# Patient Record
Sex: FEMALE | Race: BLACK OR AFRICAN AMERICAN | Employment: FULL TIME | URBAN - METROPOLITAN AREA
[De-identification: names, ages, dates, MRNs, and addresses within clinical notes are randomized per-mention and may not be internally consistent; named-entity substitution may affect disease eponyms.]

---

## 2018-08-21 ENCOUNTER — HOSPITAL ENCOUNTER (OUTPATIENT)
Dept: PHYSICAL THERAPY | Age: 63
Discharge: HOME | End: 2018-08-21
Payer: COMMERCIAL

## 2018-08-21 PROCEDURE — 97161 PT EVAL LOW COMPLEX 20 MIN: CPT | Performed by: PHYSICAL THERAPIST

## 2018-08-21 PROCEDURE — 97110 THERAPEUTIC EXERCISES: CPT | Performed by: PHYSICAL THERAPIST

## 2018-08-21 NOTE — PROGRESS NOTES
[facility] [address] Yogi Saldivar, [address] [facility]  Ph [phone]  Fx [phone]    Plan of Care/ Statement of Necessity for Physical Therapy Services    Patient name: Anneliese Stockton Start of Care: 2018   Referral source: Carrie Jesus MD : 1955    Medical Diagnosis: Achilles tendinitis, left leg [M76.62]  Achilles tendinitis, right leg [M76.61]  Calcaneal spur, left foot [M77.32]   Onset Date:1.5months    Treatment Diagnosis: bilateral ankle pain   Prior Hospitalization: see medical history Provider#: [id number]   Medications: Verified on Patient summary List    Comorbidities: RA, HTN, hx Hep B. Prior Level of Function: sedentary      The Plan of Care and following information is based on the information from the initial evaluation. Assessment/ key information: Patient present with signs/symptoms of bilateral achilles tendinopathy. Swelling in ankles appears related to rheumatoid arthritis flare up. Has reduction of pain with body weight plantar flexion isometrics and normalization of gait characteristics. Patient will continue to benefit from skilled PT services to modify and progress therapeutic interventions, address functional mobility deficits, address ROM deficits, address strength deficits, analyze and address soft tissue restrictions, analyze and cue movement patterns, analyze and modify body mechanics/ergonomics and assess and modify postural abnormalities to attain remaining goals.     Evaluation Complexity History MEDIUM  Complexity : 1-2 comorbidities / personal factors will impact the outcome/ POC ; Examination LOW Complexity : 1-2 Standardized tests and measures addressing body structure, function, activity limitation and / or participation in recreation  ;Presentation LOW Complexity : Stable, uncomplicated  ;Clinical Decision Making MEDIUM Complexity : FOTO score of 26-74  Overall Complexity Rating: LOW   Problem List: pain affecting function, decrease ROM, decrease strength, edema affecting function, impaired gait/ balance, decrease ADL/ functional abilitiies, decrease activity tolerance, decrease flexibility/ joint mobility and decrease transfer abilities   Treatment Plan may include any combination of the following: Therapeutic exercise, Therapeutic activities, Neuromuscular re-education, Physical agent/modality, Gait/balance training, Manual therapy, Aquatic therapy, Patient education and Self Care training  Patient / Family readiness to learn indicated by: asking questions, trying to perform skills and interest  Persons(s) to be included in education: patient (P)  Barriers to Learning/Limitations: None  Patient Goal (s): my ankle heel get back to normal no swelling & have to limp when I walk  Patient Self Reported Health Status: good  Rehabilitation Potential: good    Short Term Goals: To be accomplished in 2 weeks:   Patient will report compliance with HEP at least 1x/day to aid in rehabilitation program.   Status at IE: NA   Current:     Long Term Goals: To be accomplished in 4 weeks:   Patient will increase strength to 4+/5 throughout B LEs to aid in return recreational activities and ADLs. Status at IE:4/5 in plantar flexion   Current:     Patient will report pain less than 1-2/10 average to aid in completion of ADLs. Status at IE:6 or worse   Current:     Patient will ambulate 1000ft on level surface and no AD pain free with normalized gait to complete ADLs. Status at IE:antalgic gait   Current:     Patient will improve FOTO to 77 points overall to demonstrate improvement in functional ability. Status at IE:5   Current:         Frequency / Duration: Patient to be seen 2 times per week for 6 weeks.     Patient/ Caregiver education and instruction: Diagnosis, prognosis, self care, activity modification and exercises   [x]  Plan of care has been reviewed with YEISON Dumont PT, DPT 8/21/2018 4:06 PM    ________________________________________________________________________    I certify that the above Therapy Services are being furnished while the patient is under my care. I agree with the treatment plan and certify that this therapy is necessary.     Physician's Signature:____________Date:_________TIME:________    [employer], Date and Time must be completed for valid certification **  Please sign and return to [facility]  [address], [address]  Ph [phone]  Fx [phone]

## 2018-08-21 NOTE — PROGRESS NOTES
PT DAILY TREATMENT NOTE/FOOT AND ANKLE EVAL 3-16    Patient Name: Anneliese Stockton  Date:2018  : 1955  [x]  Patient  Verified  Payor: Shey Zavala / Plan: [address] [place] [address] / Product Type: PPO /    In time:3:00  Out time:4:00  Total Treatment Time (min): 60  Total Timed Codes (min): 30  1:1 Treatment Time ( only): 60   Visit #: 1 of 12    Treatment Area: Achilles tendinitis, left leg [M76.62]  Achilles tendinitis, right leg [M76.61]  Calcaneal spur, left foot [M77.32]    SUBJECTIVE  Pain Level (0-10 scale): 6  []constant []intermittent []improving []worsening []no change since onset    Any medication changes, allergies to medications, adverse drug reactions, diagnosis change, or new procedure performed?: [x] No    [] Yes (see summary sheet for update)  Subjective functional status/changes:     Patient has CC of B ankle pain for 1.5 months. JIMENA: none. Patient describes pain as sore, TTP, constant. . No diurnal features. Denies numbness/tingling. Denies popping/clicking. Aggravating factors:walking, dorsiflexion. Alleviating factors: rest.  Denies red flags: SOB, chest pain, dizziness/lightheadedness, blurred/double vision, HA, chills/fevers, night sweats, change in bowel/bladder control, abdominal pain, difficulty swallowing, slurred speech, unexplained weight gain/loss. PMHx: RA, HTN, hx Hep B. Surgical Hx: unremarkable. Social Hx: 3 [employer] home, denies alcohol & tobacco, work status:  at [address]. PLOF: sedentary. CLOF: same.   Diagnostic Imaging: x-ray: heel spurs      OBJECTIVE/EXAMINATION    30- min [x]Eval                  []Re-Eval       30 min Therapeutic Exercise:  [x] See flow sheet :   Rationale: increase ROM, increase strength and decrease pain to improve the patients ability to complete ADLs      With   [] TE   [] TA   [] neuro   [] other: Patient Education: [x] Review HEP    [] Progressed/Changed HEP based on:   [] positioning   [] body mechanics   [] transfers   [] heat/ice application    [] other:        Physical Therapy Evaluation  - Foot and Ankle    Gait: [] Normal    [x] Abnormal    [x] Antalgic    [] NWB    Device:  Describe:    ROM/Strength  [] Unable to assess at this time  ROM: WNL throughout             Strength (1-5)   Left  Right   Dorsiflexion 5 5   Plantarflexion 4- 4-   Inversion 5 4   Eversion 5 5   Great Toe Ext 5 5     Flexibility: [] Unable to assess at this time    Palpation:   Location: achilles tendon insertion bilaterally  Patient's Pain Response: [x] Min   [] Mod   [] Severe  Location:  Patient's Pain Response: [] Min   [] Mod   [] Severe      Swelling:   Left (cm) Right (cm)   Figure 8: 49.5 51.5     Anterior Drawer: [x] Neg    [] Pos  Posterior Drawer:  [x] Neg    [] Pos  Inversion Stress:  [x] Neg    [] Pos  Talar Tilt:   [] Neg    [] Pos    Other tests/ comments:       Pain Level (0-10 scale) post treatment: 3    ASSESSMENT/Changes in Function: Patient present with signs/symptoms of bilateral achilles tendinopathy. Swelling in ankles appears related to rheumatoid arthritis flare up. Has reduction of pain with body weight plantar flexion isometrics and normalization of gait characteristics. Patient will continue to benefit from skilled PT services to modify and progress therapeutic interventions, address functional mobility deficits, address ROM deficits, address strength deficits, analyze and address soft tissue restrictions, analyze and cue movement patterns, analyze and modify body mechanics/ergonomics and assess and modify postural abnormalities to attain remaining goals.      [x]  See Plan of Care  []  See progress note/recertification  []  See Discharge Summary         Progress towards goals / Updated goals:  See POC    PLAN  []  Upgrade activities as tolerated     [x]  Continue plan of care  []  Update interventions per flow sheet       []  Discharge due to:_  []  Other:_      Jose Ariza, PT, DPT 8/21/2018  3:03 PM

## 2018-08-23 ENCOUNTER — HOSPITAL ENCOUNTER (OUTPATIENT)
Dept: PHYSICAL THERAPY | Age: 63
Discharge: HOME | End: 2018-08-23
Payer: COMMERCIAL

## 2018-08-23 PROCEDURE — 97530 THERAPEUTIC ACTIVITIES: CPT

## 2018-08-23 PROCEDURE — 97110 THERAPEUTIC EXERCISES: CPT

## 2018-08-23 NOTE — PROGRESS NOTES
PT DAILY TREATMENT NOTE     Patient Name: Koki Isaac  Date:2018  : 1955  [x]  Patient  Verified  Payor: Christoph Oliveros / Plan: [address] [place] [address] / Product Type: PPO /    In time:4:30  Out time:5:08  Total Treatment Time (min): 38  Visit #: 2 of 12    Treatment Area: Achilles tendinitis, left leg [M76.62]  Achilles tendinitis, right leg [M76.61]  Calcaneal spur, left foot [M77.32]    SUBJECTIVE  Pain Level (0-10 scale): 10  Any medication changes, allergies to medications, adverse drug reactions, diagnosis change, or new procedure performed?: [x] No    [] Yes (see summary sheet for update)  Subjective functional status/changes:   [] No changes reported  \"It just hurts in the backs of my heels really. \"    OBJECTIVE      28 min Therapeutic Exercise:  [x] See flow sheet :   Rationale: increase ROM, increase strength and improve coordination to improve the patients ability to return to prior level of physical activity. 10 min Therapeutic Activity:  [x]  See flow sheet :   Rationale: increase ROM, increase strength and improve coordination  to improve the patients ability to perform ADLs with less pain. With   [] TE   [] TA   [] neuro   [] other: Patient Education: [x] Review HEP    [] Progressed/Changed HEP based on:   [] positioning   [] body mechanics   [] transfers   [] heat/ice application    [] other:      Other Objective/Functional Measures:      Pain Level (0-10 scale) post treatment: 5/10    ASSESSMENT/Changes in Function: Pt tolerated ex with moderate discomfort but overall light decrease in pain. Pt reports compliance with HEP of several times per day. Pt had no adverse affects post tx.      Patient will continue to benefit from skilled PT services to modify and progress therapeutic interventions, address functional mobility deficits, address ROM deficits, address strength deficits, analyze and address soft tissue restrictions, analyze and cue movement patterns, analyze and modify body mechanics/ergonomics and assess and modify postural abnormalities to attain remaining goals. []  See Plan of Care  []  See progress note/recertification  []  See Discharge Summary         Progress towards goals / Updated goals:  Short Term Goals: To be accomplished in 2 weeks:                        Patient will report compliance with HEP at least 1x/day to aid in rehabilitation program.                        Status at IE: NA                        Current:  [address] be accomplished in 4 weeks:                        Patient will increase strength to 4+/5 throughout B LEs to aid in return recreational activities and ADLs. Status at IE:4/5 in plantar flexion                        Current:                           Patient will report pain less than 1-2/10 average to aid in completion of ADLs. Status at IE:6 or worse                        Current:                           Patient will ambulate 1000ft on level surface and no AD pain free with normalized gait to complete ADLs. Status at IE:antalgic gait                        Current:                           Patient will improve FOTO to 77 points overall to demonstrate improvement in functional ability.                         Status at IE:5                        Current:    PLAN  [x]  Upgrade activities as tolerated     [x]  Continue plan of care  []  Update interventions per flow sheet       []  Discharge due to:_  []  Other:_      Steve Wray PTA 8/23/2018  4:39 PM    Future Appointments  Date Time Provider Marques Gan   8/29/2018 3:00 PM Steve Wray [facility]   8/31/2018 3:00 PM Steve Wray PTA [facility]   9/4/2018 4:00 PM Abdiel Boykin PT, DPT [facility]   9/6/2018 3:30 PM Abdiel Boykin PT, DPT [facility]   9/11/2018 3:00 PM Steve Wray PTA [facility]   9/13/2018 3:30 PM Abdiel Boykin PT, DPT [facility]

## 2018-08-29 ENCOUNTER — HOSPITAL ENCOUNTER (OUTPATIENT)
Dept: PHYSICAL THERAPY | Age: 63
Discharge: HOME | End: 2018-08-29
Payer: COMMERCIAL

## 2018-08-29 PROCEDURE — 97110 THERAPEUTIC EXERCISES: CPT

## 2018-08-29 PROCEDURE — 97530 THERAPEUTIC ACTIVITIES: CPT

## 2018-08-29 NOTE — PROGRESS NOTES
PT DAILY TREATMENT NOTE  Patient Name: Bubba Eli 
Date:2018 : 1955 [x]  Patient  Verified Payor: BLUE CROSS / Plan: [address] [place] [address] / Product Type: PPO / In time:3:00  Out time:3:45 Total Treatment Time (min): 45 Visit #: 3 of 12 Treatment Area: Achilles tendinitis, left leg [M76.62] Achilles tendinitis, right leg [M76.61] Calcaneal spur, left foot [M77.32] SUBJECTIVE Pain Level (0-10 scale): 610 Any medication changes, allergies to medications, adverse drug reactions, diagnosis change, or new procedure performed?: [x] No    [] Yes (see summary sheet for update) Subjective functional status/changes:   [] No changes reported \"I have the same pain in my heels. \" 
 
OBJECTIVE 30 min Therapeutic Exercise:  [x] See flow sheet :  
Rationale: increase ROM, increase strength and improve coordination to improve the patients ability to perform ADLs with less pain. 15 min Therapeutic Activity:  [x]  See flow sheet :  
Rationale: increase ROM, increase strength, improve coordination and improve balance  to improve the patients ability to perform ADLs with less pain. With 
 [] TE 
 [] TA 
 [] neuro 
 [] other: Patient Education: [x] Review HEP [] Progressed/Changed HEP based on:  
[] positioning   [] body mechanics   [] transfers   [] heat/ice application   
[] other:   
 
Other Objective/Functional Measures: Access Code: [email or web address]  
URL: [email or web address]  
Date: 2018 Prepared by: Boyd Ruff Pain Level (0-10 scale) post treatment: 7.510 ASSESSMENT/Changes in Function: Pt progressed well will ex. Pt tolerated ex with moderate increased pain post tx. Pt given updated HEP to continue with outside of therapy sessions and ensure maximum benefit.  
 
Patient will continue to benefit from skilled PT services to modify and progress therapeutic interventions, address functional mobility deficits, address ROM deficits, address strength deficits, analyze and address soft tissue restrictions, analyze and cue movement patterns, analyze and modify body mechanics/ergonomics and assess and modify postural abnormalities to attain remaining goals. []  See Plan of Care 
[]  See progress note/recertification 
[]  See Discharge Summary Progress towards goals / Updated goals: 
Short Term Goals: To be accomplished in 2 weeks: 
                      [id number] will report compliance with HEP at least 1x/day to aid in rehabilitation program. 
                      Status at IE: NA 
                      Current: pt reports compliance of 1x/day 
   
Long Term Goals: To be accomplished in 4 weeks: 
                      Patient will increase strength to 4+/5 throughout B LEs to aid in return recreational activities and ADLs.                       CIHCVE at IE:4/5 in plantar flexion 
                      Current: 
   
                      Patient will report pain less than 1-2/10 average to aid in completion of ADLs.                       RQBFZE at IE:6 or worse 
                      Current: 
   
                      Patient will ambulate 1000ft on level surface and no AD pain free with normalized gait to complete ADLs.                       WXRTSL at [address] gait 
                      Current: 
   
                      Patient will improve FOTO to 77 points overall to demonstrate improvement in functional ability.                       JFAZSY at IE:5 
                      Current: PLAN [x]  Upgrade activities as tolerated     [x]  Continue plan of care 
[]  Update interventions per flow sheet      
[]  Discharge due to:_ 
[]  Other:_ Candy Reed PTA 8/29/2018  3:07 PM 
 
Future Appointments Date Time Provider Marques Gan 8/31/2018 3:00 PM Candy Reed PTA [facility]  
9/4/2018 4:00 PM Jorje Flaherty PT, DPT [facility]  
9/6/2018 3:30 PM Jorje Flaherty PT, DPT [facility]  
 9/11/2018 3:00 PM Nisha Baeza PTA [facility]  
9/13/2018 3:30 PM Brigitte Garrison, PT, DPT [facility]

## 2018-08-31 ENCOUNTER — APPOINTMENT (OUTPATIENT)
Dept: PHYSICAL THERAPY | Age: 63
End: 2018-08-31
Payer: COMMERCIAL

## 2018-09-04 ENCOUNTER — HOSPITAL ENCOUNTER (OUTPATIENT)
Dept: PHYSICAL THERAPY | Age: 63
Discharge: HOME | End: 2018-09-04
Payer: COMMERCIAL

## 2018-09-04 PROCEDURE — 97530 THERAPEUTIC ACTIVITIES: CPT | Performed by: PHYSICAL THERAPIST

## 2018-09-04 PROCEDURE — 97110 THERAPEUTIC EXERCISES: CPT | Performed by: PHYSICAL THERAPIST

## 2018-09-04 NOTE — PROGRESS NOTES
PT DAILY TREATMENT NOTE  Patient Name: Karina Lagunas 
Date:2018 : 1955 [x]  Patient  Verified Payor: BLUE CROSS / Plan: [address] [place] [address] / Product Type: PPO / In time:4:04  Out time:4:43 Total Treatment Time (min): 39 Visit #: 4 of 12 Treatment Area: Achilles tendinitis, left leg [M76.62] Achilles tendinitis, right leg [M76.61] Calcaneal spur, left foot [M77.32] SUBJECTIVE Pain Level (0-10 scale): 5.5 Any medication changes, allergies to medications, adverse drug reactions, diagnosis change, or new procedure performed?: [x] No    [] Yes (see summary sheet for update) Subjective functional status/changes:   [] No changes reported Feels alright. No new issues. Report swelling at the end of long days. OBJECTIVE 30 min Therapeutic Exercise:  [x] See flow sheet :  
Rationale: increase ROM, increase strength and decrease pain to improve the patients ability to complete ADLs 
 
9 min Therapeutic Activity:  [x]  See flow sheet :  
Rationale: increase ROM, increase strength and improve coordination  to improve the patients ability to complete ADLs With 
 [] TE 
 [] TA 
 [] neuro 
 [] other: Patient Education: [x] Review HEP [] Progressed/Changed HEP based on:  
[] positioning   [] body mechanics   [] transfers   [] heat/ice application   
[] other:   
 
Other Objective/Functional Measures:   
 
Pain Level (0-10 scale) post treatment: 0 
 
ASSESSMENT/Changes in Function: Patient responds well to treatment session. Minimal difficulty with the exercises as prescribe, with exception of the SL heel raise. . No adverse effects were noted from today's treatment session Patient will continue to benefit from skilled PT services to modify and progress therapeutic interventions, address functional mobility deficits, address ROM deficits, address strength deficits, analyze and address soft tissue restrictions, analyze and cue movement patterns, analyze and modify body mechanics/ergonomics and assess and modify postural abnormalities to attain remaining goals. []  See Plan of Care 
[]  See progress note/recertification 
[]  See Discharge Summary Progress towards goals / Updated goals: 
Short Term Goals: To be accomplished in 2 weeks: 
                      [id number] will report compliance with HEP at least 1x/day to aid in rehabilitation program. 
                      Status at IE: NA 
                      Current: pt reports compliance of 1x/day 
   
Long Term Goals: To be accomplished in 4 weeks: 
                      Patient will increase strength to 4+/5 throughout B LEs to aid in return recreational activities and ADLs.                       EOKSAW at IE:4/5 in plantar flexion 
                      Current: 
   
                      Patient will report pain less than 1-2/10 average to aid in completion of ADLs.                       JJKWXF at IE:6 or worse 
                      Current: 
   
                      Patient will ambulate 1000ft on level surface and no AD pain free with normalized gait to complete ADLs.                       PNUUZP at [address] gait 
                      Current: 
   
                      Patient will improve FOTO to 77 points overall to demonstrate improvement in functional ability.                       SRWJTU at IE:5 
                      Current: PLAN 
[]  Upgrade activities as tolerated     [x]  Continue plan of care 
[]  Update interventions per flow sheet      
[]  Discharge due to:_ 
[]  Other:_ Mariaa Staples PT, DPT 9/4/2018  4:10 PM 
 
Future Appointments Date Time Provider Marques Gan 9/6/2018 3:30 PM Mariaa Staples PT, DPT [facility]  
9/11/2018 3:00 PM Alice Crews PTA [facility]  
9/13/2018 3:30 PM Mariaa Staples PT, DPT [facility]

## 2018-09-06 ENCOUNTER — HOSPITAL ENCOUNTER (OUTPATIENT)
Dept: PHYSICAL THERAPY | Age: 63
Discharge: HOME | End: 2018-09-06
Payer: COMMERCIAL

## 2018-09-06 ENCOUNTER — APPOINTMENT (OUTPATIENT)
Dept: PHYSICAL THERAPY | Age: 63
End: 2018-09-06
Payer: COMMERCIAL

## 2018-09-11 ENCOUNTER — HOSPITAL ENCOUNTER (OUTPATIENT)
Dept: PHYSICAL THERAPY | Age: 63
Discharge: HOME | End: 2018-09-11
Payer: COMMERCIAL

## 2018-09-11 PROCEDURE — 97530 THERAPEUTIC ACTIVITIES: CPT

## 2018-09-11 PROCEDURE — 97110 THERAPEUTIC EXERCISES: CPT

## 2018-09-11 NOTE — PROGRESS NOTES
PT DAILY TREATMENT NOTE - [facility]  Patient Name: Keanu Youssef 
Date:2018 : 1955 [x]  Patient  Verified Payor: BLUE CROSS / Plan: [address] [place] [address] / Product Type: PPO / In time:3:02  Out time:3:44 Total Treatment Time (min): 42 Total Timed Codes (min): 42 
1:1 Treatment Time ([facility] only): 42 Visit #: 5 of 12 Treatment Area: Achilles tendinitis, left leg [M76.62] Achilles tendinitis, right leg [M76.61] Calcaneal spur, left foot [M77.32] SUBJECTIVE Pain Level (0-10 scale): 0/10 Any medication changes, allergies to medications, adverse drug reactions, diagnosis change, or new procedure performed?: [x] No    [] Yes (see summary sheet for update) Subjective functional status/changes:   [] No changes reported \"I have some pain on the side of my leg because I burnt it at work. \" OBJECTIVE 30 min Therapeutic Exercise:  [x] See flow sheet :  
Rationale: increase ROM, increase strength and improve coordination to improve the patients ability to return to prior level of physical activity. 12 min Therapeutic Activity:  [x]  See flow sheet :  
Rationale: increase ROM, increase strength and improve coordination  to improve the patients ability to return to prior level of physical activity. With 
 [] TE 
 [] TA 
 [] neuro 
 [] other: Patient Education: [x] Review HEP [] Progressed/Changed HEP based on:  
[] positioning   [] body mechanics   [] transfers   [] heat/ice application   
[] other:   
 
Other Objective/Functional Measures: FOTO:  71 
 
Pain Level (0-10 scale) post treatment: 5/10 ASSESSMENT/Changes in Function: Pt tolerated session well with no incresaed pain post tx. Pt continues to be fatigued by therex with resistive band ex but not above tolerance. Pt shows Patient will continue to benefit from skilled PT services to modify and progress therapeutic interventions, address functional mobility deficits, address ROM deficits, address strength deficits, analyze and address soft tissue restrictions, analyze and cue movement patterns, analyze and modify body mechanics/ergonomics and assess and modify postural abnormalities to attain remaining goals. []  See Plan of Care 
[]  See progress note/recertification 
[]  See Discharge Summary Progress towards goals / Updated goals: 
Short Term Goals: To be accomplished in 2 weeks: 
                      QMUGTVF will report compliance with HEP at least 1x/day to aid in rehabilitation program. 
                      Status at IE: NA 
                      Current: pt reports compliance of 1x/day 
   
Long Term Goals: To be accomplished in 4 weeks: 
                      Patient will increase strength to 4+/5 throughout B LEs to aid in return recreational activities and ADLs.                       LTWQHL at IE:4/5 in plantar flexion 
                      Current: 
   
                      Patient will report pain less than 1-2/10 average to aid in completion of ADLs.                       WPQXMX at IE:6 or worse 
                      Current: 
   
                      Patient will ambulate 1000ft on level surface and no AD pain free with normalized gait to complete ADLs.                       KQOCPK at [address] gait 
                      Current: 
   
                      Patient will improve FOTO to 77 points overall to demonstrate improvement in functional ability.                       IVWMGM at IE:5 
                      FHOAITT: 60 
 
EVJY [x]  Upgrade activities as tolerated     [x]  Continue plan of care 
[]  Update interventions per flow sheet      
[]  Discharge due to:_ 
[]  Other:_ Ethan Ramirez PTA 9/11/2018  3:05 PM 
 
Future Appointments Date Time Provider Marques Gan 9/13/2018 3:30 PM Ethan Ramirez PTA [facility]  
9/18/2018 4:00 PM Art Matias [facility]  
9/20/2018 3:30 PM Ethan Ramirez PTA [facility]

## 2018-09-18 ENCOUNTER — APPOINTMENT (OUTPATIENT)
Dept: PHYSICAL THERAPY | Age: 63
End: 2018-09-18
Payer: COMMERCIAL

## 2018-09-19 ENCOUNTER — HOSPITAL ENCOUNTER (OUTPATIENT)
Dept: PHYSICAL THERAPY | Age: 63
Discharge: HOME | End: 2018-09-19
Payer: COMMERCIAL

## 2018-09-19 PROCEDURE — 97530 THERAPEUTIC ACTIVITIES: CPT

## 2018-09-19 NOTE — PROGRESS NOTES
[facility] 
[address] [place], [address] [facility] Ph [phone]  Fx [phone] Physical Therapy Progress Note Patient name: Christopher Singh Start of Care: 18 Referral source: Janice Delacruz DPM : 1955 Medical/Treatment Diagnosis: Achilles tendinitis, left leg [M76.62] Achilles tendinitis, right leg [M76.61] Calcaneal spur, left foot [M77.32] Onset Date:2.5months ago Prior Hospitalization: see medical history Provider#: [id number] Medications: Verified on Patient Summary List   
Comorbidities: RA, HTN, hx Hep B Prior Level of Function:sedentary Visits from Start of Care: 7    Missed Visits: 1 due to work injury Short Term Goals: To be accomplished in 2 weeks: 
                      VUCYXJK will report compliance with HEP at least 1x/day to aid in rehabilitation program. 
                      Status at IE: NA 
                      Current: pt reports compliance of 1x/day - MET 
   
Long Term Goals: To be accomplished in 4 weeks: 
                      Patient will increase strength to 4+/5 throughout B LEs to aid in return recreational activities and ADLs.                       EWRGXX at IE:4/5 in plantar flexion 
                      Current: 4/5 in plantar flexion MMT 
   
                      Patient will report pain less than 1-2/10 average to aid in completion of ADLs.                       MQRDCQ at IE:6 or worse 
                      Current:  Up to 4/10 - Progressing  
   
                      Patient will ambulate 1000ft on level surface and no AD pain free with normalized gait to complete ADLs.                       EYZDQQ at [address] gait 
                      Current: continued antalgic gait    
                      Patient will improve FOTO to 77 points overall to demonstrate improvement in functional ability.  
                      AMIFJH at IE:5 
                      XWKELT 
 
 Key Functional Changes: Decreased pain Updated Goals: to be achieved in 4 weeks: 
 Same as unmet above ASSESSMENT/RECOMMENDATIONS: 61year old patient continues to present to OP PT with bilateral heel pain. She reports decreased pain, but otherwise is making slow progress towards her goals. She did tolerate single leg heel raises ands step ups with decreased pain on an unstable surface. However, step height needed to be decreased in order for her to complete her step up and overs due to increased pain. Patient will continue to benefit from skilled PT services to modify and progress therapeutic interventions, address functional mobility deficits, address ROM deficits, address strength deficits, analyze and address soft tissue restrictions, analyze and cue movement patterns, analyze and modify body mechanics/ergonomics and address imbalance/dizziness to attain remaining goals. [x]Continue therapy per initial plan/protocol at a frequency of  2 x per week for 4 weeks []Continue therapy with the following recommended changes:_____________________      _____________________________________________________________________ []Discontinue therapy progressing towards or have reached established goals []Discontinue therapy due to lack of appreciable progress towards goals []Discontinue therapy due to lack of attendance or compliance []Await Physician's recommendations/decisions regarding therapy []Other:________________________________________________________________ Thank you for this referral.  
Mahesh Browne, PT 9/19/2018 7:31 PM 
 
NOTE TO PHYSICIAN:  PLEASE COMPLETE THE ORDERS BELOW AND  
FAX TO [facility]: [phone] If you are unable to process this request in 24 hours please contact our office: [phone]   
 
____ I have read the above report and request that my patient continue therapy with the following changes/special instructions: ____ I have read the above report and request that my patient be discharged from therapy [de-identified] Signature:____________Date:_________TIME:________ 
 
[facility], Date and Time must be completed for valid certification **

## 2018-09-19 NOTE — PROGRESS NOTES
PT DAILY TREATMENT NOTE  Patient Name: Monserrat Lowe 
Date:2018 : 1955 [x]  Patient  Verified Payor: [employer] / Plan: [address] [place] [address] / Product Type: PPO / In time:4:00  Out time:4:35 Total Treatment Time (min): 35 Visit #: 7 of 12 Treatment Area: Achilles tendinitis, left leg [M76.62] Achilles tendinitis, right leg [M76.61] Calcaneal spur, left foot [M77.32] SUBJECTIVE Pain Level (0-10 scale): 4/10 Any medication changes, allergies to medications, adverse drug reactions, diagnosis change, or new procedure performed?: [x] No    [] Yes (see summary sheet for update) Subjective functional status/changes:   [] No changes reported \"It didn't hurt as much over the weekend. \" \"I'm feeling maybe 50% better. \" OBJECTIVE 35 min Therapeutic Activity:  [x]  See flow sheet :  
Rationale: increase strength  to improve the patients ability to complete her ADLs with decreased symptoms With 
 [x] TE 
 [] TA 
 [] neuro 
 [] other: Patient Education: [x] Review HEP [] Progressed/Changed HEP based on:  
[] positioning   [] body mechanics   [] transfers   [] heat/ice application   
[] other:   
 
Other Objective/Functional Measures: Added airex pad to heel raises Added bosu ball step ups Pain Level (0-10 scale) post treatment: 4.5/10 in her right heel ASSESSMENT/Changes in Function: 61year old patient continues to present to OP PT with bilateral heel pain. She reports decreased pain, but otherwise is making slow progress towards her goals. She did tolerate single leg heel raises ands step ups with decreased pain on an unstable surface. However, step height needed to be decreased in order for her to complete her step up and overs due to increased pain.   Patient will continue to benefit from skilled PT services to modify and progress therapeutic interventions, address functional mobility deficits, address ROM deficits, address strength deficits, analyze and address soft tissue restrictions, analyze and cue movement patterns, analyze and modify body mechanics/ergonomics and address imbalance/dizziness to attain remaining goals. []  See Plan of Care [x]  See progress note/recertification 
[]  See Discharge Summary Progress towards goals / Updated goals: 
Short Term Goals: To be accomplished in 2 weeks: 
                      IITKXZA will report compliance with HEP at least 1x/day to aid in rehabilitation program. 
                      Status at IE: NA 
                      Current: pt reports compliance of 1x/day - MET 
   
Long Term Goals: To be accomplished in 4 weeks: 
                      Patient will increase strength to 4+/5 throughout B LEs to aid in return recreational activities and ADLs.                       LAFJTL at IE:4/5 in plantar flexion 
                      Current: 4/5 in plantar flexion MMT 
   
                      Patient will report pain less than 1-2/10 average to aid in completion of ADLs.                       ZQFPHG at IE:6 or worse 
                      Current:  Up to 4/10 - Progressing  
   
                      Patient will ambulate 1000ft on level surface and no AD pain free with normalized gait to complete ADLs.                       TTNLBD at [address] gait 
                      Current: continued antalgic gait    
                      Patient will improve FOTO to 77 points overall to demonstrate improvement in functional ability.                       PKDFYS at IE:5 
                      MORHCGN: 39 
 
HEPI [x]  Upgrade activities as tolerated     [x]  Continue plan of care 
[]  Update interventions per flow sheet      
[]  Discharge due to:_ 
[]  Other:_   
 
Yunior Grant, PT 9/19/2018  4:08 PM 
 
Future Appointments Date Time Provider Marques Gan 9/20/2018 3:30 PM Horace Mendoza PTA [facility]

## 2018-09-20 ENCOUNTER — APPOINTMENT (OUTPATIENT)
Dept: PHYSICAL THERAPY | Age: 63
End: 2018-09-20
Payer: COMMERCIAL

## 2018-09-26 ENCOUNTER — HOSPITAL ENCOUNTER (OUTPATIENT)
Dept: PHYSICAL THERAPY | Age: 63
Discharge: HOME | End: 2018-09-26
Payer: COMMERCIAL

## 2018-09-26 PROCEDURE — 97110 THERAPEUTIC EXERCISES: CPT

## 2018-09-26 NOTE — PROGRESS NOTES
PT DAILY TREATMENT NOTE  Patient Name: Marcela Reading 
Date:2018 : 1955 [x]  Patient  Verified Payor: [employer] / Plan: [address] [place] [address] / Product Type: PPO / In time:3:01  Out time:3:39 Total Treatment Time (min): 39 Visit #: 3 of 12 Treatment Area: Achilles tendinitis, left leg [M76.62] Achilles tendinitis, right leg [M76.61] Calcaneal spur, left foot [M77.32] SUBJECTIVE Pain Level (0-10 scale): 6/10 Any medication changes, allergies to medications, adverse drug reactions, diagnosis change, or new procedure performed?: [x] No    [] Yes (see summary sheet for update) Subjective functional status/changes:   [] No changes reported \"It's painful where the burn is. \" OBJECTIVE 39 min Therapeutic Exercise:  [x] See flow sheet :  
Rationale: increase ROM, increase strength and improve coordination to improve the patients ability to perform ADLs with less pain. With 
 [] TE 
 [] TA 
 [] neuro 
 [] other: Patient Education: [x] Review HEP [] Progressed/Changed HEP based on:  
[] positioning   [] body mechanics   [] transfers   [] heat/ice application   
[] other:   
 
Other Objective/Functional Measures:   
 
Pain Level (0-10 scale) post treatment: 4/10 ASSESSMENT/Changes in Function: Pt continues to be limited by ankle burn from work but reports ability to participate in therapy. Pt reports continued pain with ankle movements but not increased above initial levels. Patient will continue to benefit from skilled PT services to modify and progress therapeutic interventions, address functional mobility deficits, address ROM deficits, address strength deficits, analyze and address soft tissue restrictions, analyze and cue movement patterns, analyze and modify body mechanics/ergonomics and assess and modify postural abnormalities to attain remaining goals. []  See Plan of Care 
[]  See progress note/recertification 
[]  See Discharge Summary Progress towards goals / Updated goals: 
Short Term Goals: To be accomplished in 2 weeks: 
                      HEIZXCF will report compliance with HEP at least 1x/day to aid in rehabilitation program. 
                      Status at IE: NA 
                      Current: pt reports compliance of 1x/day - MET 
   
Long Term Goals: To be accomplished in 4 weeks: 
                      Patient will increase strength to 4+/5 throughout B LEs to aid in return recreational activities and ADLs.                       NYAJAB at IE:4/5 in plantar flexion 
                      Current: 4/5 in plantar flexion MMT 
   
                      Patient will report pain less than 1-2/10 average to aid in completion of ADLs.                       PQQQSP at IE:6 or worse 
                      Current:  Up to 4/10 - Progressing  
   
                      Patient will ambulate 1000ft on level surface and no AD pain free with normalized gait to complete ADLs.                       PFDXMM at [address] gait 
                      Current: continued antalgic gait    
                      Patient will improve FOTO to 77 points overall to demonstrate improvement in functional ability.                       XBHJDY at IE:5 
                      HAEZDEM: 82 
 
WKXV [x]  Upgrade activities as tolerated     [x]  Continue plan of care 
[]  Update interventions per flow sheet      
[]  Discharge due to:_ 
[]  Other:_ Ethan Ramirez, PTA 9/26/2018  3:36 PM 
 
No future appointments.

## 2019-09-17 NOTE — PROGRESS NOTES
[facility]  [address] [place], [address] [facility]  Ph [phone]  Fx [phone]    Physical Therapy Discharge Summary    Patient name: Marcela Modi Start of Care: 18   Referral source: Jude López DPM : 1955   Medical/Treatment Diagnosis: Achilles tendinitis, left leg [M76.62]  Achilles tendinitis, right leg [M76.61]  Calcaneal spur, left foot [M77.32] Onset Date:2.5 mos ago     Prior Hospitalization: see medical history Provider#: [id number]   Medications: Verified on Patient Summary List    Comorbidities: RA, HTN, hx Hep B  Prior Level of Function:sedentary    Visits from Start of Care: 8    Missed Visits: 1    Reporting Period : 18 to 18    Summary of Care:    Unable to formally assess goals as pt failed to show for scheduled followup appts. Please DC to HEP at this time. Thank you for this referral. Pt will require new order if she requires further PT services. Short Term Goals: To be accomplished in 2 weeks:                        HYJIPYG will report compliance with HEP at least 1x/day to aid in rehabilitation program.                        Status at IE: NA                        Current: pt reports compliance of 1x/day - MET      Long Term Goals: To be accomplished in 4 weeks:                        Patient will increase strength to 4+/5 throughout B LEs to aid in return recreational activities and ADLs.                       BNALHP at IE:4/5 in plantar flexion                        Current: 4/5 in plantar flexion MMT                            Patient will report pain less than 1-2/10 average to aid in completion of ADLs.                       BWYTBN at IE:6 or worse                        Current:  Up to 4/10 - Progressing                             Patient will ambulate 1000ft on level surface and no AD pain free with normalized gait to complete ADLs.                         AFGEGU at [address] gait                        Current: continued antalgic gait                             Patient will improve FOTO to 77 points overall to demonstrate improvement in functional ability.                         SICFJE at IE:5                        MMISAQZ: 71    ASSESSMENT/RECOMMENDATIONS:  [x]Discontinue therapy: []Patient has reached or is progressing toward set goals      [x]Patient is non-compliant or has abdicated      []Due to lack of appreciable progress towards set goals    Yesica Heaton, PT 9/17/2019 7:39 AM

## 2021-05-24 ENCOUNTER — TRANSCRIBE ORDER (OUTPATIENT)
Dept: SCHEDULING | Age: 66
End: 2021-05-24

## 2021-05-24 DIAGNOSIS — M85.30 OSTEITIS CONDENSANS: Primary | ICD-10-CM

## 2021-06-04 ENCOUNTER — HOSPITAL ENCOUNTER (OUTPATIENT)
Dept: NUCLEAR MEDICINE | Age: 66
Discharge: HOME | End: 2021-06-04
Attending: INTERNAL MEDICINE
Payer: COMMERCIAL

## 2021-06-04 DIAGNOSIS — M85.30 OSTEITIS CONDENSANS: ICD-10-CM

## 2021-06-04 PROCEDURE — A9503 TC99M MEDRONATE: HCPCS
